# Patient Record
Sex: FEMALE | URBAN - METROPOLITAN AREA
[De-identification: names, ages, dates, MRNs, and addresses within clinical notes are randomized per-mention and may not be internally consistent; named-entity substitution may affect disease eponyms.]

---

## 2022-11-22 ENCOUNTER — OFFICE VISIT (OUTPATIENT)
Dept: URBAN - METROPOLITAN AREA CLINIC 81 | Facility: CLINIC | Age: 77
End: 2022-11-22
Payer: MEDICARE

## 2022-11-22 DIAGNOSIS — H52.4 PRESBYOPIA: ICD-10-CM

## 2022-11-22 DIAGNOSIS — H43.813 VITREOUS DEGENERATION, BILATERAL: ICD-10-CM

## 2022-11-22 DIAGNOSIS — H25.813 COMBINED FORMS OF AGE-RELATED CATARACT, BILATERAL: Primary | ICD-10-CM

## 2022-11-22 PROCEDURE — 92310 CONTACT LENS FITTING OU: CPT | Performed by: OPTOMETRIST

## 2022-11-22 PROCEDURE — 92004 COMPRE OPH EXAM NEW PT 1/>: CPT | Performed by: OPTOMETRIST

## 2022-11-22 ASSESSMENT — KERATOMETRY
OD: 44.00
OS: 44.00

## 2022-11-22 ASSESSMENT — VISUAL ACUITY
OS: 20/30
OD: 20/30

## 2022-11-22 NOTE — IMPRESSION/PLAN
Impression: Presbyopia: H52.4.
-confirmed axis change OD and OS Plan: Discussed diagnosis with patient. Dispensed Rx for glasses and contacts to patient.

## 2022-11-22 NOTE — IMPRESSION/PLAN
Impression: Vitreous degeneration, bilateral: H43.813.

-deferred dilation after extensive education on benefits. Plan: Discussed, asymptomatic OU, monitor.